# Patient Record
Sex: FEMALE | Race: WHITE | NOT HISPANIC OR LATINO | Employment: FULL TIME | URBAN - METROPOLITAN AREA
[De-identification: names, ages, dates, MRNs, and addresses within clinical notes are randomized per-mention and may not be internally consistent; named-entity substitution may affect disease eponyms.]

---

## 2021-07-05 ENCOUNTER — ANCILLARY PROCEDURE (OUTPATIENT)
Dept: ULTRASOUND IMAGING | Facility: CLINIC | Age: 31
End: 2021-07-05
Attending: FAMILY MEDICINE
Payer: COMMERCIAL

## 2021-07-05 DIAGNOSIS — R10.2 PELVIC PAIN: ICD-10-CM

## 2021-07-05 PROCEDURE — 76830 TRANSVAGINAL US NON-OB: CPT | Mod: GC | Performed by: RADIOLOGY

## 2021-07-05 PROCEDURE — 76856 US EXAM PELVIC COMPLETE: CPT | Mod: GC | Performed by: RADIOLOGY

## 2021-07-13 ENCOUNTER — TRANSFERRED RECORDS (OUTPATIENT)
Dept: HEALTH INFORMATION MANAGEMENT | Facility: CLINIC | Age: 31
End: 2021-07-13

## 2021-07-19 ENCOUNTER — ANCILLARY PROCEDURE (OUTPATIENT)
Dept: CT IMAGING | Facility: CLINIC | Age: 31
End: 2021-07-19
Attending: FAMILY MEDICINE
Payer: COMMERCIAL

## 2021-07-19 DIAGNOSIS — R10.31 RLQ ABDOMINAL PAIN: ICD-10-CM

## 2021-07-19 PROCEDURE — 74177 CT ABD & PELVIS W/CONTRAST: CPT | Performed by: RADIOLOGY

## 2021-07-19 RX ORDER — IOPAMIDOL 755 MG/ML
73 INJECTION, SOLUTION INTRAVASCULAR ONCE
Status: COMPLETED | OUTPATIENT
Start: 2021-07-19 | End: 2021-07-19

## 2021-07-19 RX ADMIN — IOPAMIDOL 73 ML: 755 INJECTION, SOLUTION INTRAVASCULAR at 16:27

## 2021-07-20 ENCOUNTER — TRANSFERRED RECORDS (OUTPATIENT)
Dept: HEALTH INFORMATION MANAGEMENT | Facility: CLINIC | Age: 31
End: 2021-07-20

## 2021-07-20 ENCOUNTER — MEDICAL CORRESPONDENCE (OUTPATIENT)
Dept: HEALTH INFORMATION MANAGEMENT | Facility: CLINIC | Age: 31
End: 2021-07-20

## 2021-07-22 ENCOUNTER — TRANSCRIBE ORDERS (OUTPATIENT)
Dept: OTHER | Age: 31
End: 2021-07-22

## 2021-07-22 DIAGNOSIS — R10.31 RLQ ABDOMINAL PAIN: Primary | ICD-10-CM

## 2021-07-23 NOTE — TELEPHONE ENCOUNTER
REFERRAL INFORMATION:    Referring Provider:  Dr. Renee Correa     Referring Clinic:  [facility]     Reason for Visit/Diagnosis: RLQ Abdominal pain      FUTURE VISIT INFORMATION:    Appointment Date: 10/13/2021    Appointment Time: 8 AM      NOTES STATUS DETAILS   OFFICE NOTE from Referring Provider Received 7/13/2021, 7/2/2021 Office visit with Dr. Correa      OFFICE NOTE from Other Specialist N/A    HOSPITAL DISCHARGE SUMMARY/  ED VISITS N/A    OPERATIVE REPORT N/A    MEDICATION LIST Internal         ENDOSCOPY  N/A    COLONOSCOPY N/A    ERCP N/A    EUS N/A    STOOL TESTING N/A    PERTINENT LABS Care Everywhere    PATHOLOGY REPORTS (RELATED) N/A    IMAGING (CT, MRI, EGD, MRCP, Small Bowel Follow Through/SBT, MR/CT Enterography) Internal CT Abdomen Pelvis: 7/19/2021

## 2021-10-04 ENCOUNTER — OFFICE VISIT (OUTPATIENT)
Dept: DERMATOLOGY | Facility: CLINIC | Age: 31
End: 2021-10-04
Payer: COMMERCIAL

## 2021-10-04 DIAGNOSIS — D48.9 NEOPLASM OF UNCERTAIN BEHAVIOR: ICD-10-CM

## 2021-10-04 DIAGNOSIS — D22.9 MULTIPLE BENIGN NEVI: Primary | ICD-10-CM

## 2021-10-04 DIAGNOSIS — D48.5 NEOPLASM OF UNCERTAIN BEHAVIOR OF SKIN: ICD-10-CM

## 2021-10-04 PROCEDURE — 11102 TANGNTL BX SKIN SINGLE LES: CPT | Performed by: PHYSICIAN ASSISTANT

## 2021-10-04 PROCEDURE — 88305 TISSUE EXAM BY PATHOLOGIST: CPT | Mod: TC | Performed by: PHYSICIAN ASSISTANT

## 2021-10-04 PROCEDURE — 88305 TISSUE EXAM BY PATHOLOGIST: CPT | Mod: 26 | Performed by: DERMATOLOGY

## 2021-10-04 PROCEDURE — 99202 OFFICE O/P NEW SF 15 MIN: CPT | Mod: 25 | Performed by: PHYSICIAN ASSISTANT

## 2021-10-04 RX ORDER — GABAPENTIN 400 MG/1
400 CAPSULE ORAL
COMMUNITY
Start: 2021-06-01

## 2021-10-04 ASSESSMENT — PAIN SCALES - GENERAL: PAINLEVEL: NO PAIN (0)

## 2021-10-04 NOTE — LETTER
10/4/2021       RE: Karen Marquez  [address]  [facility] [address]     Dear Colleague,    Thank you for referring your patient, Karen Marquez, to the [facility]ERMATOLOGY [facility] [place] at [facility]. Please see a copy of my visit note below.    [facility] Dermatology Note  Encounter Date: Oct 4, 2021  Office Visit     Dermatology Problem List:  # NUB, left upper back, s/p bx 10/4/2021     ____________________________________________    Assessment & Plan:    # NUB, left upper back, s/p shave biopsy ddx congenital nevus vs dysplastic nevus vs mm  - Shave biopsy today, see note below.  - Photo today    # Nevi on the back.  ABCDs of melanoma were discussed and self skin checks were advised.     Procedures Performed:   None    Follow-up: 1 year(s) in-person, or earlier for new or changing lesions    Staff and Scribe:     Provider Disclosure:   The documentation recorded by the scribe accurately reflects the services I personally performed and the decisions made by me.    All risks, benefits and alternatives were discussed with patient.  Patient is in agreement and understands the assessment and plan.  All questions were answered.  Sun Screen Education was given.   Return to Clinic annually or sooner as needed.   Tamara Hooks PA-C   [facility]     Scribe Disclosure:  I, Rhett Omer, am serving as a scribe to document services personally performed by Tamara Hooks PA-C based on data collection and the provider's statements to me.   ____________________________________________    CC: Derm Problem (moles on upper back)    HPI:  Ms. Karen Marquez is a(n) 31 year old female who presents today as a new patient for skin check.    Today, patient reports a changing mole on her left upper back that she would like examined. She reports limited sun exposure and some burns throughout  her life, but she now tries to avoid sun exposure.  Also, she reports that her father has had a number of biopsies, but she is not aware of any skin cancers. Finally, patient reports that she often has a vasovagal response to procedures.    Patient is otherwise feeling well, without additional skin concerns.    Labs Reviewed:  N/A    Physical Exam:  Vitals: There were no vitals taken for this visit.  SKIN: Focused examination of back and forearms was performed.  - 8 mm variegated brown macule, left upper back.  -A few round brown symmetric macules on back.  - No other lesions of concern on areas examined.             Medications:  No current outpatient medications on file.     No current facility-administered medications for this visit.      Past Medical History:   There is no problem list on file for this patient.    No past medical history on file.     CC Referred Self, MD  No address on file on close of this encounter.

## 2021-10-04 NOTE — PROGRESS NOTES
[facility] Dermatology Note  Encounter Date: Oct 4, 2021  Office Visit     Dermatology Problem List:  # NUB, left upper back, s/p bx 10/4/2021     ____________________________________________    Assessment & Plan:    # NUB, left upper back, s/p shave biopsy ddx congenital nevus vs dysplastic nevus vs mm  - Shave biopsy today, see note below.  - Photo today    # Nevi on the back.  ABCDs of melanoma were discussed and self skin checks were advised.     Procedures Performed:   None    Follow-up: 1 year(s) in-person, or earlier for new or changing lesions    Staff and Scribe:     Provider Disclosure:   The documentation recorded by the scribe accurately reflects the services I personally performed and the decisions made by me.    All risks, benefits and alternatives were discussed with patient.  Patient is in agreement and understands the assessment and plan.  All questions were answered.  Sun Screen Education was given.   Return to Clinic annually or sooner as needed.   Tamara Hooks PA-C   [facility]ermatology [facility]     Scribe Disclosure:  I, Rhett Omer, am serving as a scribe to document services personally performed by Tamara Hooks PA-C based on data collection and the provider's statements to me.   ____________________________________________    CC: Derm Problem (moles on upper back)    HPI:  Ms. Karen Marquez is a(n) 31 year old female who presents today as a new patient for skin check.    Today, patient reports a changing mole on her left upper back that she would like examined. She reports limited sun exposure and some burns throughout her life, but she now tries to avoid sun exposure.  Also, she reports that her father has had a number of biopsies, but she is not aware of any skin cancers. Finally, patient reports that she often has a vasovagal response to procedures.    Patient is otherwise feeling well, without additional skin concerns.    Labs  Reviewed:  N/A    Physical Exam:  Vitals: There were no vitals taken for this visit.  SKIN: Focused examination of back and forearms was performed.  - 8 mm variegated brown macule, left upper back.  -A few round brown symmetric macules on back.  - No other lesions of concern on areas examined.             Medications:  No current outpatient medications on file.     No current facility-administered medications for this visit.      Past Medical History:   There is no problem list on file for this patient.    No past medical history on file.     CC Referred Self, MD  No address on file on close of this encounter.

## 2021-10-04 NOTE — NURSING NOTE
Chief Complaint   Patient presents with     Derm Problem     moles on upper back     Pt states she has a mole on her back that has grown in size.  She is unsure how long it has been growing.  Pt has no family history of melanoma.   Mary Lou Barreto LPN on 10/4/2021 at 3:58 PM

## 2021-10-06 LAB
PATH REPORT.COMMENTS IMP SPEC: NORMAL
PATH REPORT.COMMENTS IMP SPEC: NORMAL
PATH REPORT.FINAL DX SPEC: NORMAL
PATH REPORT.GROSS SPEC: NORMAL
PATH REPORT.MICROSCOPIC SPEC OTHER STN: NORMAL
PATH REPORT.RELEVANT HX SPEC: NORMAL

## 2021-10-12 VITALS — BODY MASS INDEX: 19.13 KG/M2 | WEIGHT: 119 LBS | HEIGHT: 66 IN

## 2021-10-12 ASSESSMENT — MIFFLIN-ST. JEOR: SCORE: 1271.53

## 2021-10-12 NOTE — PROGRESS NOTES
Karen is a 31 year old who is being evaluated via a billable video visit.      How would you like to obtain your AVS? MyChart  If the video visit is dropped, the invitation should be resent by: Text to cell phone: [phone]  Will anyone else be joining your video visit? No    Video Start Time: 8:12  Video-Visit Details    Type of service:  Video Visit    Video End Time:8:44    Originating Location (pt. Location): Home    Distant Location (provider location):  [facility]STRO[facility]OLOGY [facility]IC [place]     Platform used for Video Visit: Sunlot         During this virtual visit the patient is located in [place], patient verifies this as the location during the entirety of this visit.     Ebonie Hernandez, EMT  Surgery Clinic          GASTROENTEROLOGY NEW PATIENT VIDEO VISIT    CC/REFERRING MD:    Renee Correa    REASON FOR CONSULTATION:   Renee Correa for   Chief Complaint   Patient presents with     Consult     RUQ abdominal pain.       HISTORY OF PRESENT ILLNESS:    Karen Marquez is a 31 year old female who is being evaluated via a billable video visit.      Is concerned regarding RLQ pain, she has had therapy with the pelvic floor center, and the RLQ pain improved after a few weeks of therapy but last night it returned, it feels like a dull ache.      Ongoing x few months, intermittent, always resolved in day or 2.  Had some imaging, notes she was not in pain during the imaging.    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    PERTINENT PAST MEDICAL HISTORY:  (I personally reviewed this history with the patient at today's visit)   No past medical history on file.  Vulvodynia - pelvic floor therapy  S/p pudendal nerve block, CT guided      PREVIOUS SURGERIES: (I personally reviewed this history with the patient at today's visit)   No past surgical history on file.    ALLERGIES:     Allergies   Allergen Reactions     Penicillins Rash       PERTINENT MEDICATIONS:    Current Outpatient Medications:      gabapentin (NEURONTIN) 400 MG capsule, Take 400 mg by mouth, Disp: , Rfl:      levonorgestrel (MIRENA) 20 MCG/24HR IUD, 1 each by Intrauterine route once, Disp: , Rfl:     SOCIAL HISTORY:  Social History     Occupational History     Not on file   Tobacco Use     Smoking status: Never Smoker     Smokeless tobacco: Never Used   Substance and Sexual Activity     Alcohol use: Yes     Comment: 1-2 drinks per month     Drug use: Never     Sexual activity: Not on file       FAMILY HISTORY:   Family History   Problem Relation Age of Onset     Colon Cancer Paternal Grandfather      Colon Cancer Maternal Uncle         Review of Systems  Review of Systems   Constitutional: Negative for weight loss.   Eyes: Negative for pain.   Gastrointestinal: Positive for abdominal pain, diarrhea and nausea. Negative for blood in stool, constipation, heartburn, melena and vomiting.   Musculoskeletal: Positive for back pain.   Skin: Negative for rash.   Neurological: Positive for headaches.        HA improved with IUD used to be at same time as menses        Exam:    General appearance: in no acute distress  Ears, nose, mouth and throat:    Hearing intact  Respiratory:  Normal respiration, no use of accessory muscles e   Psychiatric:  Oriented to person, place and time,  Appropriate mood and affect.   Neurologic:  Peripheral muscle function and dexterity appear to be intact        PERTINENT STUDIES have been reviewed.  7/19/21 CT abdo with con    FINDINGS: The included lower chest she has mild motion artifact with  no nodule, consolidation or effusion.  Liver, gallbladder, spleen, pancreas, bilateral adrenal glands and  bilateral kidneys appear normal. Abdominal aorta and IVC appear  grossly normal. Fluid-filled small bowel suggests enteritis. No  evidence of obstruction. No evidence of free air. IUD noted within the  uterus. Left ovarian follicles and possible right ovarian follicles  noted. No free fluid in the pelvis or abdomen. Bones appear  age-appropriate.                                                                      IMPRESSION: Bilateral ovarian follicles. IUD within the uterus. No  evidence of acute inflammatory change or mass.      MRI [facility]    Impression/Recommendations:    Karen Marquez is a 31 year old female who presents for evaluation of intermittent RLQ pain.  Imaging in July suggested possibility of enteritis.  She also experiences diarrhea after eating outside the home.  She is under treatment and evaluation at [facility] and some of the treatment has been helpful for the RLQ pain as well.  ddx includes celiac vs SIBO vs IBD vs other.  Plan is to start with labs, cont to monitor symptoms, patient will keep us updated and RTC 3 months earlier prn. Hx of CRC in 2nd deg relatives noted.  Cont to monitor for indication for endoscopic evaluation.      Jenn Del Valle MD        Thank you for this consultation.  It was a pleasure to participate in the care of this patient; please contact us with any further questions.      Time spent in appointment today was 32 minutes.

## 2021-10-12 NOTE — NURSING NOTE
"Chief Complaint   Patient presents with     Consult     RUQ abdominal pain.       Vitals:    10/12/21 1553   Weight: 119 lb   Height: 5' 6\"       Body mass index is 19.21 kg/m .      Ebonie Hernandez, EMT  Surgery Clinic                      "

## 2021-10-13 ENCOUNTER — VIRTUAL VISIT (OUTPATIENT)
Dept: GASTROENTEROLOGY | Facility: CLINIC | Age: 31
End: 2021-10-13
Attending: FAMILY MEDICINE
Payer: COMMERCIAL

## 2021-10-13 ENCOUNTER — PRE VISIT (OUTPATIENT)
Dept: GASTROENTEROLOGY | Facility: CLINIC | Age: 31
End: 2021-10-13

## 2021-10-13 DIAGNOSIS — R10.31 RLQ ABDOMINAL PAIN: ICD-10-CM

## 2021-10-13 DIAGNOSIS — R68.81 EARLY SATIETY: ICD-10-CM

## 2021-10-13 DIAGNOSIS — R11.0 NAUSEA: Primary | ICD-10-CM

## 2021-10-13 PROCEDURE — 99203 OFFICE O/P NEW LOW 30 MIN: CPT | Mod: 95 | Performed by: INTERNAL MEDICINE

## 2021-10-13 NOTE — LETTER
10/13/2021         RE: Karen Marquez  [address]  [facility] [address]        Dear Colleague,    Thank you for referring your patient, Karen Marquez, to the [facility]OLOG[facility] [place]. Please see a copy of my visit note below.    GASTROENTEROLOGY NEW PATIENT VIDEO VISIT    CC/REFERRING MD:    Renee Correa    REASON FOR CONSULTATION:   Renee Correa for   Chief Complaint   Patient presents with     Consult     RUQ abdominal pain.       HISTORY OF PRESENT ILLNESS:    Karen Marquez is a 31 year old female who is being evaluated via a billable video visit.      Is concerned regarding RLQ pain, she has had therapy with the pelvic floor center, and the RLQ pain improved after a few weeks of therapy but last night it returned, it feels like a dull ache.      Ongoing x few months, intermittent, always resolved in day or 2.  Had some imaging, notes she was not in pain during the imaging.    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    PERTINENT PAST MEDICAL HISTORY:  (I personally reviewed this history with the patient at today's visit)   No past medical history on file.  Vulvodynia - pelvic floor therapy  S/p pudendal nerve block, CT guided      PREVIOUS SURGERIES: (I personally reviewed this history  with the patient at today's visit)   No past surgical history on file.    ALLERGIES:     Allergies   Allergen Reactions     Penicillins Rash       PERTINENT MEDICATIONS:    Current Outpatient Medications:      gabapentin (NEURONTIN) 400 MG capsule, Take 400 mg by mouth, Disp: , Rfl:      levonorgestrel (MIRENA) 20 MCG/24HR IUD, 1 each by Intrauterine route once, Disp: , Rfl:     SOCIAL HISTORY:  Social History     Occupational History     Not on file   Tobacco Use     Smoking status: Never Smoker     Smokeless tobacco: Never Used   Substance and Sexual Activity     Alcohol use: Yes     Comment: 1-2 drinks per month     Drug use: Never     Sexual activity: Not on file       FAMILY HISTORY:   Family History   Problem Relation Age of Onset     Colon Cancer Paternal Grandfather      Colon Cancer Maternal Uncle         Review of Systems  Review of Systems   Constitutional: Negative for weight loss.   Eyes: Negative for pain.   Gastrointestinal: Positive for abdominal pain, diarrhea and nausea. Negative for blood in stool, constipation, heartburn, melena and vomiting.   Musculoskeletal: Positive for back pain.   Skin: Negative for rash.   Neurological: Positive for headaches.        HA improved with IUD used to be at same time as menses        Exam:    General appearance: in no acute distress  Ears, nose, mouth and throat:    Hearing intact  Respiratory:  Normal respiration, no use of accessory muscles e   Psychiatric:  Oriented to person, place and time, Appropriate mood and affect.   Neurologic:  Peripheral muscle function and dexterity appear to be intact        PERTINENT STUDIES have been reviewed.  7/19/21 CT abdo with con    FINDINGS: The included lower chest she has mild motion artifact with  no nodule, consolidation or effusion.  Liver, gallbladder, spleen, pancreas, bilateral adrenal glands and  bilateral kidneys appear normal. Abdominal aorta and IVC appear  grossly normal. Fluid-filled small bowel suggests  enteritis. No  evidence of obstruction. No evidence of free air. IUD noted within the  uterus. Left ovarian follicles and possible right ovarian follicles  noted. No free fluid in the pelvis or abdomen. Bones appear  age-appropriate.                                                                      IMPRESSION: Bilateral ovarian follicles. IUD within the uterus. No  evidence of acute inflammatory change or mass.      MRI [facility]    Impression/Recommendations:    Karen Marquez is a 31 year old female who presents for evaluation of intermittent RLQ pain.  Imaging in July suggested possibility of enteritis.  She also experiences diarrhea after eating outside the home.  She is under treatment and evaluation at [place] and some of the treatment has been helpful for the RLQ pain as well.  ddx includes celiac vs SIBO vs IBD vs other.  Plan is to start with labs, cont to monitor symptoms, patient will keep us updated and RTC 3 months earlier prn. Hx of CRC in 2nd deg relatives noted.  Cont to monitor for indication for endoscopic evaluation.      Jenn Del Valle MD        Thank you for this consultation.  It was a pleasure to participate in the care of this patient; please contact us with any further questions.      Time spent in appointment today was 32 minutes.            Again, thank you for allowing me to participate in the care of your patient.      Sincerely,    Jenn Del Valle MD

## 2021-10-17 ENCOUNTER — HEALTH MAINTENANCE LETTER (OUTPATIENT)
Age: 31
End: 2021-10-17

## 2021-10-19 ASSESSMENT — ENCOUNTER SYMPTOMS
HEARTBURN: 0
ABDOMINAL PAIN: 1
CONSTIPATION: 0
HEADACHES: 1
WEIGHT LOSS: 0
EYE PAIN: 0
NAUSEA: 1
DIARRHEA: 1
BLOOD IN STOOL: 0
BACK PAIN: 1
VOMITING: 0

## 2021-10-20 ENCOUNTER — PATIENT OUTREACH (OUTPATIENT)
Dept: GASTROENTEROLOGY | Facility: CLINIC | Age: 31
End: 2021-10-20

## 2021-10-20 NOTE — PROGRESS NOTES
Spoke with Karen and scheduled 3 month follow up with Dr Del Valle on 1- at 9am   Discussed the labs that need to be drawn and provided the number to schedule

## 2021-10-20 NOTE — PATIENT INSTRUCTIONS
It was a pleasure taking care of you today.  I've included a brief summary of our discussion and care plan from today's visit below.  Please review this information with your primary care provider.  ______________________________________________________________________    My recommendations are summarized as follows:  Karen Marquez is a 31 year old female who presents for evaluation of intermittent RLQ pain.  Imaging in July suggested possibility of enteritis.  She also experiences diarrhea after eating outside the home.  She is under treatment and evaluation at [facility] and some of the treatment has been helpful for the RLQ pain as well.  ddx includes celiac vs SIBO vs IBD vs other.  Plan is to start with labs, cont to monitor symptoms, patient will keep us updated and RTC 3 months earlier prn. Hx of CRC in 2nd deg relatives noted.  Cont to monitor for indication for endoscopic evaluation.  -- please see scheduling information provided below     Return to GI Clinic in 3 months to review your progress. appointment scheduled on 1- at 9am    ______________________________________________________________________    How do I schedule labs, imaging studies, or procedures that were ordered in clinic today?     Labs: To schedule lab appointment at the Clinic and Surgery Center, use my chart or call [phone]. If you have a [facility] lab closer to home where you are regularly seen you can give them a call.     Procedures: If a colonoscopy, upper endoscopy, breath test, esophageal manometry, or pH impedence was ordered today, our endoscopy team will call you to schedule this. If you have not heard from our endoscopy team within a week, please call [phone] to schedule.     Imaging Studies: If you were scheduled for a CT scan, X-ray, MRI, ultrasound, HIDA scan or other imaging study, please call [phone] to have this scheduled.     Referral: If a referral to another specialty was ordered,  expect a phone call or follow instructions above. If you have not heard from anyone regarding your referral in a week, please call our clinic to check the status.     Who do I call with any questions after my visit?  Please be in touch if there are any further questions that arise following today's visit.  There are multiple ways to contact your gastroenterology care team.        During business hours, you may reach a Gastroenterology nurse at [phone]      To schedule or reschedule an appointment, please call [phone].       You can always send a secure message through [facility].  [facility] messages are answered by your nurse or doctor typically within 24 hours.  Please allow extra time on weekends and holidays.        For urgent/emergent questions after business hours, you may reach the on-call GI Fellow by contacting the [facility]  at [phone].     How will I get the results of any tests ordered?    You will receive all of your results.  If you have signed up for [employer], any tests ordered at your visit will be available to you after your physician reviews them.  Typically this takes 1-2 weeks.  If there are urgent results that require a change in your care plan, your physician or nurse will call you to discuss the next steps.      What is [facility]?  [facility] is a secure way for you to access all of your healthcare records from the [facility].  It is a web based computer program, so you can sign on to it from any location.  It also allows you to send secure messages to your care team.  I recommend signing up for [facility] access if you have not already done so and are comfortable with using a computer.      How to I schedule a follow-up visit?  If you did not schedule a follow-up visit today, please call [phone] to schedule a follow-up office visit.      Sincerely,    Dr Del Valle

## 2021-11-22 ENCOUNTER — LAB (OUTPATIENT)
Dept: LAB | Facility: CLINIC | Age: 31
End: 2021-11-22
Payer: COMMERCIAL

## 2021-11-22 DIAGNOSIS — R10.31 RLQ ABDOMINAL PAIN: ICD-10-CM

## 2021-11-22 DIAGNOSIS — R11.0 NAUSEA: ICD-10-CM

## 2021-11-22 DIAGNOSIS — R68.81 EARLY SATIETY: ICD-10-CM

## 2021-11-22 LAB
ALBUMIN SERPL-MCNC: 3.6 G/DL (ref 3.4–5)
ALP SERPL-CCNC: 65 U/L (ref 40–150)
ALT SERPL W P-5'-P-CCNC: 18 U/L (ref 0–50)
ANION GAP SERPL CALCULATED.3IONS-SCNC: 6 MMOL/L (ref 3–14)
AST SERPL W P-5'-P-CCNC: 12 U/L (ref 0–45)
BILIRUB DIRECT SERPL-MCNC: 0.1 MG/DL (ref 0–0.2)
BILIRUB SERPL-MCNC: 0.6 MG/DL (ref 0.2–1.3)
BUN SERPL-MCNC: 6 MG/DL (ref 7–30)
CALCIUM SERPL-MCNC: 8.6 MG/DL (ref 8.5–10.1)
CHLORIDE BLD-SCNC: 106 MMOL/L (ref 94–109)
CO2 SERPL-SCNC: 24 MMOL/L (ref 20–32)
CREAT SERPL-MCNC: 0.66 MG/DL (ref 0.52–1.04)
ERYTHROCYTE [DISTWIDTH] IN BLOOD BY AUTOMATED COUNT: 11.5 % (ref 10–15)
ERYTHROCYTE [SEDIMENTATION RATE] IN BLOOD BY WESTERGREN METHOD: 6 MM/HR (ref 0–20)
FOLATE SERPL-MCNC: 11.4 NG/ML
GFR SERPL CREATININE-BSD FRML MDRD: >90 ML/MIN/1.73M2
GLUCOSE BLD-MCNC: 115 MG/DL (ref 70–99)
HCT VFR BLD AUTO: 43.4 % (ref 35–47)
HGB BLD-MCNC: 14.2 G/DL (ref 11.7–15.7)
MCH RBC QN AUTO: 30 PG (ref 26.5–33)
MCHC RBC AUTO-ENTMCNC: 32.7 G/DL (ref 31.5–36.5)
MCV RBC AUTO: 92 FL (ref 78–100)
PLATELET # BLD AUTO: 276 10E3/UL (ref 150–450)
POTASSIUM BLD-SCNC: 3.3 MMOL/L (ref 3.4–5.3)
PROT SERPL-MCNC: 7.3 G/DL (ref 6.8–8.8)
RBC # BLD AUTO: 4.74 10E6/UL (ref 3.8–5.2)
SODIUM SERPL-SCNC: 136 MMOL/L (ref 133–144)
VIT B12 SERPL-MCNC: 167 PG/ML (ref 193–986)
WBC # BLD AUTO: 7.8 10E3/UL (ref 4–11)

## 2021-11-22 PROCEDURE — 82746 ASSAY OF FOLIC ACID SERUM: CPT | Performed by: PATHOLOGY

## 2021-11-22 PROCEDURE — 85652 RBC SED RATE AUTOMATED: CPT | Performed by: PATHOLOGY

## 2021-11-22 PROCEDURE — 82784 ASSAY IGA/IGD/IGG/IGM EACH: CPT | Performed by: PATHOLOGY

## 2021-11-22 PROCEDURE — 99000 SPECIMEN HANDLING OFFICE-LAB: CPT | Performed by: PATHOLOGY

## 2021-11-22 PROCEDURE — 85027 COMPLETE CBC AUTOMATED: CPT | Performed by: PATHOLOGY

## 2021-11-22 PROCEDURE — 80053 COMPREHEN METABOLIC PANEL: CPT | Performed by: PATHOLOGY

## 2021-11-22 PROCEDURE — 36415 COLL VENOUS BLD VENIPUNCTURE: CPT | Performed by: PATHOLOGY

## 2021-11-22 PROCEDURE — 82248 BILIRUBIN DIRECT: CPT | Performed by: PATHOLOGY

## 2021-11-22 PROCEDURE — 82607 VITAMIN B-12: CPT | Performed by: PATHOLOGY

## 2021-11-22 PROCEDURE — 83516 IMMUNOASSAY NONANTIBODY: CPT | Performed by: PATHOLOGY

## 2021-11-23 LAB
IGA SERPL-MCNC: 205 MG/DL (ref 84–499)
TTG IGA SER-ACNC: 4.5 U/ML
TTG IGG SER-ACNC: 0.8 U/ML

## 2021-12-13 ENCOUNTER — TELEPHONE (OUTPATIENT)
Dept: GASTROENTEROLOGY | Facility: CLINIC | Age: 31
End: 2021-12-13
Payer: COMMERCIAL

## 2021-12-13 NOTE — TELEPHONE ENCOUNTER
lvm to RESCHedule 1/12/22 appt with LIT MORATAYA, [facility] number [facility]

## 2021-12-14 ENCOUNTER — TELEPHONE (OUTPATIENT)
Dept: GASTROENTEROLOGY | Facility: CLINIC | Age: 31
End: 2021-12-14
Payer: COMMERCIAL

## 2021-12-14 NOTE — TELEPHONE ENCOUNTER
lvm to RESCHEUDLE appt with LIT MORATAYA, canceled appt, left call center phone number and sent mychart.

## 2021-12-16 DIAGNOSIS — R19.7 DIARRHEA, UNSPECIFIED TYPE: ICD-10-CM

## 2021-12-16 DIAGNOSIS — R79.89 LOW VITAMIN B12 LEVEL: Primary | ICD-10-CM

## 2022-05-16 ENCOUNTER — MYC MEDICAL ADVICE (OUTPATIENT)
Dept: GASTROENTEROLOGY | Facility: CLINIC | Age: 32
End: 2022-05-16
Payer: COMMERCIAL

## 2022-05-23 NOTE — TELEPHONE ENCOUNTER
Called to remind patient of their upcoming appointment with our GI clinic, on Wednesday 6/1/2022 at 3PM with Dr. Jenn Del Valle. This appointment is scheduled as a video visit. You will receive a call approximately 30 minutes prior to check you in, you must be in [place] for this visit., if your appointment is virtual (video or telephone) you need to be in [place] for the visit. To reschedule or cancel patient to call [phone].    Kerry Dalton MA

## 2022-06-17 ENCOUNTER — VIRTUAL VISIT (OUTPATIENT)
Dept: GASTROENTEROLOGY | Facility: CLINIC | Age: 32
End: 2022-06-17
Payer: COMMERCIAL

## 2022-06-17 VITALS — BODY MASS INDEX: 19.37 KG/M2 | WEIGHT: 120 LBS

## 2022-06-17 DIAGNOSIS — R79.89 LOW VITAMIN B12 LEVEL: ICD-10-CM

## 2022-06-17 DIAGNOSIS — R19.7 DIARRHEA, UNSPECIFIED TYPE: Primary | ICD-10-CM

## 2022-06-17 DIAGNOSIS — E87.6 HYPOKALEMIA: ICD-10-CM

## 2022-06-17 PROCEDURE — 99214 OFFICE O/P EST MOD 30 MIN: CPT | Mod: 95 | Performed by: INTERNAL MEDICINE

## 2022-06-17 ASSESSMENT — PAIN SCALES - GENERAL: PAINLEVEL: MILD PAIN (2)

## 2022-06-17 NOTE — LETTER
6/17/2022         RE: Karen Marquez  [address]  [facility] [address]        Dear Colleague,    Thank you for referring your patient, Karen Marquez, to the [facility]STROENTER[facility] [place]. Please see a copy of my visit note below.      GASTROENTEROLOGY Video Follow up visit    CC/REFERRING MD:    Renee Correa    HISTORY OF PRESENT ILLNESS:    Karen Marquez is a 32 year old female who is being evaluated via a billable video visit.      Last seen in GI 10/13/21    Has been doctoring with [facility] since Fall 2020.  Studies completed there: pelvic US, CT, MRI, CT guided pudendal nerve blocks. Therapy completed: pelvic floor PT.    Sx update: still gets RLQ pain about once a week  New: central lower abdominal cramping   IUD in situ  Intermittent diarrhea, sometimes without an obvious trigger - occurs about once every 2 weeks  Intermittent early satiety, sometimes identifiably after eating fatty foods  NSAID use: once a week, one tablet        I have reviewed and updated the patient's Past Medical History, Social History, Family History and Medication List.    ALLERGIES  Penicillins    PE/  Gen: pleasant NAD  HEENT: hearing intact  Psych: AOx3, normal mood and affect    PERTINENT STUDIES have been reviewed.  Low Vit B12    Impression/Recommendations:    Karen Marquez is a 32 year old female who presents for follow up of intermittent RLQ pain and intermittent diarrhea and intermittent early satiety.  These symptoms are separate.  Previous imaging 7/2021 suggested possibility of enteritis (fluid filled small bowel).  She also experiences diarrhea after eating outside the home.  She is under treatment and evaluation at [facility] and some of the treatment had been helpful for the RLQ pain as well.    Previous labs identified low Vit B12, could be diet related.  Hx of CRC in 2nd deg relatives noted.    Cont to monitor for indication for endoscopic  evaluation or eval of the small bowel.  Labs today: CRP, repeat BMP, eval of low B12  Appointment duration: 25 minutes    RTC 3 months    Thank you for this consultation.  It was a pleasure to participate in the care of this patient; please contact us with any further questions.        Sincerely,    Jenn Del Valle MD

## 2022-06-17 NOTE — PROGRESS NOTES
Karen is a 32 year old who is being evaluated via a billable video visit.      How would you like to obtain your AVS? MyChart  If the video visit is dropped, the invitation should be resent by: Send to e-mail at: [email or web address]  Will anyone else be joining your video visit? No      Video-Visit Details    Video Start Time: 12:54    Type of service:  Video Visit    Video End Time:1:19    Originating Location (pt. Location): Home    Distant Location (provider location):  [facility]STRO[facility] [place]     Platform used for Video Visit: [employer]           GASTROENTEROLOGY Video Follow up visit    CC/REFERRING MD:    Renee Correa    HISTORY OF PRESENT ILLNESS:    Karen Marquez is a 32 year old female who is being evaluated via a billable video visit.      Last seen in GI 10/13/21    Has been doctoring with [facility] since Fall 2020.  Studies completed there: pelvic US, CT, MRI, CT guided pudendal nerve blocks. Therapy completed: pelvic floor PT.    Sx update: still gets RLQ pain about once a week  New: central lower abdominal cramping   IUD in situ  Intermittent diarrhea, sometimes without an obvious trigger - occurs about once every 2 weeks  Intermittent early satiety, sometimes identifiably after eating fatty foods  NSAID use: once a week, one tablet        I have reviewed and updated the patient's Past Medical History, Social History, Family History and Medication List.    ALLERGIES  Penicillins    PE/  Gen: pleasant NAD  HEENT: hearing intact  Psych: AOx3, normal mood and affect    PERTINENT STUDIES have been reviewed.  Low Vit B12    Impression/Recommendations:    Karen Marquez is a 32 year old female who presents for follow up of intermittent RLQ pain and intermittent diarrhea and intermittent early satiety.  These symptoms are separate.  Previous imaging 7/2021 suggested possibility of enteritis (fluid filled small bowel).  She also experiences diarrhea after eating  outside the home.  She is under treatment and evaluation at [facility] and some of the treatment had been helpful for the RLQ pain as well.    Previous labs identified low Vit B12, could be diet related.  Hx of CRC in 2nd deg relatives noted.    Cont to monitor for indication for endoscopic evaluation or eval of the small bowel.  Labs today: CRP, repeat BMP, eval of low B12  Appointment duration: 25 minutes    RTC 3 months    Thank you for this consultation.  It was a pleasure to participate in the care of this patient; please contact us with any further questions.

## 2022-06-20 ENCOUNTER — LAB (OUTPATIENT)
Dept: LAB | Facility: CLINIC | Age: 32
End: 2022-06-20
Payer: COMMERCIAL

## 2022-06-20 DIAGNOSIS — R79.89 LOW VITAMIN B12 LEVEL: ICD-10-CM

## 2022-06-20 DIAGNOSIS — R19.7 DIARRHEA, UNSPECIFIED TYPE: ICD-10-CM

## 2022-06-20 DIAGNOSIS — E87.6 HYPOKALEMIA: ICD-10-CM

## 2022-06-20 LAB
ANION GAP SERPL CALCULATED.3IONS-SCNC: 6 MMOL/L (ref 3–14)
BUN SERPL-MCNC: 6 MG/DL (ref 7–30)
CALCIUM SERPL-MCNC: 9.3 MG/DL (ref 8.5–10.1)
CHLORIDE BLD-SCNC: 106 MMOL/L (ref 94–109)
CO2 SERPL-SCNC: 29 MMOL/L (ref 20–32)
CREAT SERPL-MCNC: 0.62 MG/DL (ref 0.52–1.04)
CRP SERPL-MCNC: <2.9 MG/L (ref 0–8)
DEPRECATED CALCIDIOL+CALCIFEROL SERPL-MC: 13 UG/L (ref 20–75)
GFR SERPL CREATININE-BSD FRML MDRD: >90 ML/MIN/1.73M2
GLUCOSE BLD-MCNC: 97 MG/DL (ref 70–99)
INR PPP: 1.04 (ref 0.85–1.15)
POTASSIUM BLD-SCNC: 4.1 MMOL/L (ref 3.4–5.3)
SODIUM SERPL-SCNC: 141 MMOL/L (ref 133–144)
TSH SERPL DL<=0.005 MIU/L-ACNC: 1.07 MU/L (ref 0.4–4)

## 2022-06-20 PROCEDURE — 82306 VITAMIN D 25 HYDROXY: CPT | Performed by: PATHOLOGY

## 2022-06-20 PROCEDURE — 84590 ASSAY OF VITAMIN A: CPT | Performed by: PATHOLOGY

## 2022-06-20 PROCEDURE — 85610 PROTHROMBIN TIME: CPT | Performed by: PATHOLOGY

## 2022-06-20 PROCEDURE — 36415 COLL VENOUS BLD VENIPUNCTURE: CPT | Performed by: PATHOLOGY

## 2022-06-20 PROCEDURE — 99000 SPECIMEN HANDLING OFFICE-LAB: CPT | Performed by: PATHOLOGY

## 2022-06-20 PROCEDURE — 80048 BASIC METABOLIC PNL TOTAL CA: CPT | Performed by: PATHOLOGY

## 2022-06-20 PROCEDURE — 84443 ASSAY THYROID STIM HORMONE: CPT | Performed by: PATHOLOGY

## 2022-06-20 PROCEDURE — 86340 INTRINSIC FACTOR ANTIBODY: CPT | Performed by: PATHOLOGY

## 2022-06-20 PROCEDURE — 83516 IMMUNOASSAY NONANTIBODY: CPT | Performed by: PATHOLOGY

## 2022-06-20 PROCEDURE — 86140 C-REACTIVE PROTEIN: CPT | Performed by: PATHOLOGY

## 2022-06-21 LAB — IF BLOCK AB SER QL RIA: NEGATIVE

## 2022-06-22 LAB — PCA IGG SER-ACNC: 10.5 UNITS

## 2022-06-23 LAB
ANNOTATION COMMENT IMP: NORMAL
RETINYL PALMITATE SERPL-MCNC: 0.06 MG/L
VIT A SERPL-MCNC: 0.47 MG/L

## 2022-06-29 DIAGNOSIS — E55.9 VITAMIN D DEFICIENCY: Primary | ICD-10-CM

## 2022-06-29 RX ORDER — ERGOCALCIFEROL 1.25 MG/1
50000 CAPSULE, LIQUID FILLED ORAL WEEKLY
Qty: 8 CAPSULE | Refills: 0 | Status: SHIPPED | OUTPATIENT
Start: 2022-06-29 | End: 2022-08-18

## 2022-07-05 ENCOUNTER — TELEPHONE (OUTPATIENT)
Dept: DERMATOLOGY | Facility: CLINIC | Age: 32
End: 2022-07-05

## 2022-07-05 NOTE — TELEPHONE ENCOUNTER
Attempt to call patient to schedule annual skin follow up in the Dermatology Clinic per [place] last visit on 10/04/21 checkout comment dispositions. Left message with Dermatology number for patient to call back to schedule. Send [employer].

## 2022-07-07 ENCOUNTER — TELEPHONE (OUTPATIENT)
Dept: DERMATOLOGY | Facility: CLINIC | Age: 32
End: 2022-07-07

## 2022-07-07 NOTE — TELEPHONE ENCOUNTER
Attempt to call patient to schedule annual skin follow up in the Dermatology Clinic per [place] last visit on 10/04/21 checkout comment dispositions. Left message with Dermatology number for patient to call back to schedule.

## 2022-10-03 ENCOUNTER — HEALTH MAINTENANCE LETTER (OUTPATIENT)
Age: 32
End: 2022-10-03

## 2022-12-07 NOTE — PATIENT INSTRUCTIONS

## 2023-02-11 ENCOUNTER — HEALTH MAINTENANCE LETTER (OUTPATIENT)
Age: 33
End: 2023-02-11

## 2023-06-04 ENCOUNTER — NURSE TRIAGE (OUTPATIENT)
Dept: NURSING | Facility: CLINIC | Age: 33
End: 2023-06-04
Payer: COMMERCIAL

## 2023-06-04 NOTE — TELEPHONE ENCOUNTER
Caller is requesting to speak with  dental resident on call   States she has been transferred several times   Call transferred to  to page per patient's request    Cherie Buck RN  FNA       Reason for Disposition    [1] Follow-up call to recent contact AND [2] information only call, no triage required    Protocols used: INFORMATION ONLY CALL - NO TRIAGE-A-

## 2023-06-19 ENCOUNTER — TRANSFERRED RECORDS (OUTPATIENT)
Dept: HEALTH INFORMATION MANAGEMENT | Facility: CLINIC | Age: 33
End: 2023-06-19
Payer: COMMERCIAL

## 2024-03-09 ENCOUNTER — HEALTH MAINTENANCE LETTER (OUTPATIENT)
Age: 34
End: 2024-03-09

## 2024-10-15 ENCOUNTER — MEDICAL CORRESPONDENCE (OUTPATIENT)
Dept: HEALTH INFORMATION MANAGEMENT | Facility: CLINIC | Age: 34
End: 2024-10-15
Payer: COMMERCIAL

## 2024-11-25 NOTE — PROGRESS NOTES
Assessment & Plan   The patient presents with  recurrent acute nonstrep tonsillitis, recurrent tonsil stoens and associated halitosis  I recommend 10-14 days of periodex and then as needed for symptoms.  The remainder of the visit was spent discussing the procedure.    We discussed the risks, benefits, alternatives, options of bilateral tonsillectomy and adenoidectomy including, but not, limited to: risk of bleeding in roughly 3% of patients, risk of infection, risk of significant post-operative pain and dehydration, risk of injury to the teeth, tongue, lips, gums, VPI,  potential need to return to the OR for control bleeding, blood transfusion, risk of general anesthesia.  We discussed potential need for narcotic (opioid) pain medication and risk of dependency.  We discussed the postsurgical convalescence including time off of work or school with both activity and diet restrictions.  The patient  voiced understanding.    3M follow-up or sooner.  Could also try as needed oral steroid gargle.      Problem List Items Addressed This Visit    None  Visit Diagnoses       Recurrent tonsillitis    -  Primary    Relevant Medications    chlorhexidine (PERIDEX) 0.12 % solution    Tonsillith        Relevant Medications    chlorhexidine (PERIDEX) 0.12 % solution    Halitosis        Relevant Medications    chlorhexidine (PERIDEX) 0.12 % solution              13 minutes spent on the date of the encounter doing chart review, history and exam, documentation and further activities per the note  {     NELL Christopher  [facility] [place]    Subjective     HPI     Karen Marquez is a 34 year old female who presents today for evaluation.  The patient  reports a history of chronic tonsillitis and recurrent acute tonsillitis. The patient describes bouts of significant sore throat with swelling of the tonsils. This happens 3 times per year, and has been going on for many years. The patient has had no  positive strep tests in the past few years.  Does get recurrent tonsil stones with bad breath.    Will get lower grade sore throat about once a month.  Salt water gargle not helpful.       No issues with sleeping or snoring or apneas.    No personal or family history of bleeding disorders or problems with anesthesia.    Seen in August :  Impression and Plan:  Karen Marquez is a 33 y.o. female who presented for evaluation of tonsil drainage for one week. On exam, she has evidence of pharyngitis without evidence for other emergency conditions. Her molecular strep test today is negative. There is no clinical evidence of peritonsillar abscess, retropharyngeal abscess, or epiglottitis. I have recommended treatment with symptomatic care and a dose of Decadron that was given here in the UR     Seen again in October- virtual appt, again had negative strep    Coffee no,  Carbonated Bevs not freq, Alcohol no, Tobacco no  Doesn't get heart burn symptoms.           Review of Systems   ENT as above      Objective    /89 (BP Location: Right arm, Patient Position: Sitting, Cuff Size: Adult Regular)   Pulse 91   Resp 16   SpO2 97%     Physical Exam   Constitutional:   The patient was in no acute distress.      Head/Face:   Normocephalic and atraumatic.  No lesions or scars.     Ears:  The tympanic membranes are normal in appearance, bony landmarks are intact.  No retraction, perforation, or masses.   No fluid or purulence was seen in the external canal or the middle ear. No evidence of infection of the middle ear or external canal, cerumen was normal in appearance.    Nose:  Anterior rhinoscopy revealed midline septum and absence of purulence or polyps.      Mouth:  Normal tongue, floor of mouth, buccal mucosa, and palate.  No lesions, ulceration or  masses on inspection, normal voice quality      Oropharynx:  Normal mucosa, palate symmetric with normal elevation. Tonsils  1+  , no erythema or stones    Neck:  Supple  with normal laryngeal and tracheal landmarks. No palpable thyroid.     Lymphatic:  There is no palpable lymphadenopathy in the neck.

## 2024-12-09 ENCOUNTER — OFFICE VISIT (OUTPATIENT)
Dept: OTOLARYNGOLOGY | Facility: CLINIC | Age: 34
End: 2024-12-09
Payer: COMMERCIAL

## 2024-12-09 VITALS
SYSTOLIC BLOOD PRESSURE: 127 MMHG | RESPIRATION RATE: 16 BRPM | OXYGEN SATURATION: 97 % | HEART RATE: 91 BPM | DIASTOLIC BLOOD PRESSURE: 89 MMHG

## 2024-12-09 DIAGNOSIS — R19.6 HALITOSIS: ICD-10-CM

## 2024-12-09 DIAGNOSIS — J03.91 RECURRENT TONSILLITIS: Primary | ICD-10-CM

## 2024-12-09 DIAGNOSIS — J35.8 TONSILLITH: ICD-10-CM

## 2024-12-09 PROCEDURE — 99204 OFFICE O/P NEW MOD 45 MIN: CPT | Performed by: PHYSICIAN ASSISTANT

## 2024-12-09 RX ORDER — LORAZEPAM 0.5 MG/1
TABLET ORAL
COMMUNITY
Start: 2024-10-10

## 2024-12-09 RX ORDER — CHLORHEXIDINE GLUCONATE ORAL RINSE 1.2 MG/ML
15 SOLUTION DENTAL 2 TIMES DAILY
Qty: 300 ML | Refills: 0 | Status: SHIPPED | OUTPATIENT
Start: 2024-12-09 | End: 2024-12-19

## 2024-12-09 NOTE — LETTER
12/9/2024      Karen Marquez  [address]  [facility] [address]      Dear Colleague,    Thank you for referring your patient, Karen Marquez, to the [facility]. Please see a copy of my visit note below.       Assessment & Plan  The patient presents with  recurrent acute nonstrep tonsillitis, recurrent tonsil stoens and associated halitosis  I recommend 10-14 days of periodex and then as needed for symptoms.  The remainder of the visit was spent discussing the procedure.    We discussed the risks, benefits, alternatives, options of bilateral tonsillectomy and adenoidectomy including, but not, limited to: risk of bleeding in roughly 3% of patients, risk of infection, risk of significant post-operative pain and dehydration, risk of injury to the teeth, tongue, lips, gums, VPI,  potential need to return to the OR for control bleeding, blood transfusion, risk of general anesthesia.  We discussed potential need for narcotic (opioid) pain medication and risk of dependency.  We discussed the postsurgical convalescence including time off of work or school with both activity and diet restrictions.  The patient  voiced understanding.    3M follow-up or sooner.  Could also try as needed oral steroid gargle.      Problem List Items Addressed This Visit    None  Visit Diagnoses       Recurrent tonsillitis    -  Primary    Relevant Medications    chlorhexidine (PERIDEX) 0.12 % solution    Tonsillith        Relevant Medications    chlorhexidine (PERIDEX) 0.12 % solution    Halitosis        Relevant Medications    chlorhexidine (PERIDEX) 0.12 % solution              13 minutes spent on the date of the encounter doing chart review, history and exam, documentation and further activities per the note  {     NELL Christopher  [facility]    Subjective     HPI     Karen Marquez is a 34 year old female who presents today for evaluation.  The patient  reports a history  of chronic tonsillitis and recurrent acute tonsillitis. The patient describes bouts of significant sore throat with swelling of the tonsils. This happens 3 times per year, and has been going on for many years. The patient has had no positive strep tests in the past few years.  Does get recurrent tonsil stones with bad breath.    Will get lower grade sore throat about once a month.  Salt water gargle not helpful.       No issues with sleeping or snoring or apneas.    No personal or family history of bleeding disorders or problems with anesthesia.    Seen in August :  Impression and Plan:  Karen Marquez is a 33 y.o. female who presented for evaluation of tonsil drainage for one week. On exam, she has evidence of pharyngitis without evidence for other emergency conditions. Her molecular strep test today is negative. There is no clinical evidence of peritonsillar abscess, retropharyngeal abscess, or epiglottitis. I have recommended treatment with symptomatic care and a dose of Decadron that was given here in the UR     Seen again in October- virtual appt, again had negative strep    Coffee no,  Carbonated Bevs not freq, Alcohol no, Tobacco no  Doesn't get heart burn symptoms.           Review of Systems   ENT as above      Objective    /89 (BP Location: Right arm, Patient Position: Sitting, Cuff Size: Adult Regular)   Pulse 91   Resp 16   SpO2 97%     Physical Exam   Constitutional:   The patient was in no acute distress.      Head/Face:   Normocephalic and atraumatic.  No lesions or scars.     Ears:  The tympanic membranes are normal in appearance, bony landmarks are intact.  No retraction, perforation, or masses.   No fluid or purulence was seen in the external canal or the middle ear. No evidence of infection of the middle ear or external canal, cerumen was normal in appearance.    Nose:  Anterior rhinoscopy revealed midline septum and absence of purulence or polyps.      Mouth:  Normal tongue, floor of  mouth, buccal mucosa, and palate.  No lesions, ulceration or  masses on inspection, normal voice quality      Oropharynx:  Normal mucosa, palate symmetric with normal elevation. Tonsils  1+  , no erythema or stones    Neck:  Supple with normal laryngeal and tracheal landmarks. No palpable thyroid.     Lymphatic:  There is no palpable lymphadenopathy in the neck.                        Again, thank you for allowing me to participate in the care of your patient.        Sincerely,        NELL Christopher

## 2025-03-03 NOTE — PROGRESS NOTES
Assessment & Plan     Patient amenable to tonsillectomy for years of recurrent tonsil stones and related halitosis, but wouldn't want to sched it until November due to work.  Will trial reflux gourmet in interim.  If this leads to significant symptomatic control, can cancel the procedure.      Problem List Items Addressed This Visit    None  Visit Diagnoses       Halitosis    -  Primary    Tonsil stone                    22 minutes spent on the date of the encounter doing chart review, history and exam, documentation and further activities per the note  {     NELL Christopher  [facility]    Subjective     HPI-    Last Visit w/ me 12/9:  Assessment & Plan  The patient presents with  recurrent acute nonstrep tonsillitis, recurrent tonsil stones and associated halitosis  I recommend 10-14 days of periodex and then as needed for symptoms.  The remainder of the visit was spent discussing the procedure.     We discussed the risks, benefits, alternatives, options of bilateral tonsillectomy and adenoidectomy including, but not, limited to: risk of bleeding in roughly 3% of patients, risk of infection, risk of significant post-operative pain and dehydration, risk of injury to the teeth, tongue, lips, gums, VPI,  potential need to return to the OR for control bleeding, blood transfusion, risk of general anesthesia.  We discussed potential need for narcotic (opioid) pain medication and risk of dependency.  We discussed the postsurgical convalescence including time off of work or school with both activity and diet restrictions.  The patient  voiced understanding.     3M follow-up or sooner.  Could also try as needed oral steroid gargle.      Could consider GERD/sinus work ups.  Denies gerd, rhinitis, PND, sinus pressure, hoarseness or throat clearing     Interim Hx:  No significant changes in symptoms.        Today, reports had been on clinda some time ago for an animal bite and it didn't  change any of her symptoms.      Review of Systems   ENT as above      Objective    /72   Pulse 91   Resp 16   SpO2 100%     Physical Exam     Mouth - Examination of the oral cavity shows pink, healthy, moist mucosa.  No lesions or ulceration noted.  The dentition are in good repair.  The tongue is mobile and midline.   1+ tonsils w/o exudates or erythema.      Flexible Endoscopy -     The patient was counseled that their symptoms and history require a direct visualization with an endoscopy procedure.  They understood and we proceeded with a fiberoptic examination.  First I sprayed both sides of the nose with a mixture of lidocaine and oxymetazline.  I then passed the scope through the nasal cavity.  Color photographs were taken for the permanent medical record.  The nasal cavity was unremarkable.  The nasopharynx was mucosally covered and symmetric.  The Eustachian tube openings were unobstructed.  Going further down I had a clear view of the base of tongue which had normal appearing lingual tonsillar tissue.  The base of tongue was free of lesions, and the vallecula was open.  The epiglottis was smooth and mucosally covered.  The supraglottic larynx was then clearly visualized.  The vocal cords moved smoothly and symmetrically, they were pearly white and no lesions were seen.  The pyriform sinuses were open, and the limited view of the postcricoid region did not show any lesions. There was  cobblestoning of pharynx.   Patient tolerated well.

## 2025-03-16 ENCOUNTER — HEALTH MAINTENANCE LETTER (OUTPATIENT)
Age: 35
End: 2025-03-16

## 2025-03-17 ENCOUNTER — OFFICE VISIT (OUTPATIENT)
Dept: OTOLARYNGOLOGY | Facility: CLINIC | Age: 35
End: 2025-03-17
Payer: COMMERCIAL

## 2025-03-17 VITALS
OXYGEN SATURATION: 100 % | RESPIRATION RATE: 16 BRPM | DIASTOLIC BLOOD PRESSURE: 72 MMHG | SYSTOLIC BLOOD PRESSURE: 132 MMHG | HEART RATE: 91 BPM

## 2025-03-17 DIAGNOSIS — R19.6 HALITOSIS: Primary | ICD-10-CM

## 2025-03-17 DIAGNOSIS — J35.8 TONSIL STONE: ICD-10-CM

## 2025-03-17 NOTE — LETTER
3/17/2025      Karen Marquez  [address]  [facility] [address]      Dear Colleague,    Thank you for referring your patient, Karen Marquez, to the [facility]. Please see a copy of my visit note below.    Assessment & Plan    Patient amenable to tonsillectomy for years of recurrent tonsil stones and related halitosis, but wouldn't want to sched it until November due to work.  Will trial reflux gourmet in interim.  If this leads to significant symptomatic control, can cancel the procedure.      Problem List Items Addressed This Visit    None  Visit Diagnoses       Halitosis    -  Primary    Tonsil stone                    22 minutes spent on the date of the encounter doing chart review, history and exam, documentation and further activities per the note  {     NELL Christopher  [facility]    Subjective     HPI-    Last Visit w/ me 12/9:  Assessment & Plan  The patient presents with  recurrent acute nonstrep tonsillitis, recurrent tonsil stones and associated halitosis  I recommend 10-14 days of periodex and then as needed for symptoms.  The remainder of the visit was spent discussing the procedure.     We discussed the risks, benefits, alternatives, options of bilateral tonsillectomy and adenoidectomy including, but not, limited to: risk of bleeding in roughly 3% of patients, risk of infection, risk of significant post-operative pain and dehydration, risk of injury to the teeth, tongue, lips, gums, VPI,  potential need to return to the OR for control bleeding, blood transfusion, risk of general anesthesia.  We discussed potential need for narcotic (opioid) pain medication and risk of dependency.  We discussed the postsurgical convalescence including time off of work or school with both activity and diet restrictions.  The patient  voiced understanding.     3M follow-up or sooner.  Could also try as needed oral steroid gargle.      Could consider  GERD/sinus work ups.  Denies gerd, rhinitis, PND, sinus pressure, hoarseness or throat clearing     Interim Hx:  No significant changes in symptoms.        Today, reports had been on clinda some time ago for an animal bite and it didn't change any of her symptoms.      Review of Systems   ENT as above      Objective    /72   Pulse 91   Resp 16   SpO2 100%     Physical Exam     Mouth - Examination of the oral cavity shows pink, healthy, moist mucosa.  No lesions or ulceration noted.  The dentition are in good repair.  The tongue is mobile and midline.   1+ tonsils w/o exudates or erythema.      Flexible Endoscopy -     The patient was counseled that their symptoms and history require a direct visualization with an endoscopy procedure.  They understood and we proceeded with a fiberoptic examination.  First I sprayed both sides of the nose with a mixture of lidocaine and oxymetazline.  I then passed the scope through the nasal cavity.  Color photographs were taken for the permanent medical record.  The nasal cavity was unremarkable.  The nasopharynx was mucosally covered and symmetric.  The Eustachian tube openings were unobstructed.  Going further down I had a clear view of the base of tongue which had normal appearing lingual tonsillar tissue.  The base of tongue was free of lesions, and the vallecula was open.  The epiglottis was smooth and mucosally covered.  The supraglottic larynx was then clearly visualized.  The vocal cords moved smoothly and symmetrically, they were pearly white and no lesions were seen.  The pyriform sinuses were open, and the limited view of the postcricoid region did not show any lesions. There was  cobblestoning of pharynx.   Patient tolerated well.             Again, thank you for allowing me to participate in the care of your patient.        Sincerely,        NELL Christopher    Electronically signed